# Patient Record
Sex: FEMALE | Race: WHITE | HISPANIC OR LATINO
[De-identification: names, ages, dates, MRNs, and addresses within clinical notes are randomized per-mention and may not be internally consistent; named-entity substitution may affect disease eponyms.]

---

## 2017-11-03 ENCOUNTER — RESULT REVIEW (OUTPATIENT)
Age: 42
End: 2017-11-03

## 2022-07-16 ENCOUNTER — EMERGENCY (EMERGENCY)
Facility: HOSPITAL | Age: 47
LOS: 0 days | Discharge: HOME | End: 2022-07-16
Attending: EMERGENCY MEDICINE
Payer: MEDICAID

## 2022-07-16 VITALS
TEMPERATURE: 98 F | HEART RATE: 65 BPM | SYSTOLIC BLOOD PRESSURE: 115 MMHG | DIASTOLIC BLOOD PRESSURE: 66 MMHG | RESPIRATION RATE: 16 BRPM | OXYGEN SATURATION: 100 %

## 2022-07-16 VITALS
RESPIRATION RATE: 18 BRPM | WEIGHT: 154.98 LBS | OXYGEN SATURATION: 98 % | DIASTOLIC BLOOD PRESSURE: 52 MMHG | HEART RATE: 67 BPM | TEMPERATURE: 98 F | SYSTOLIC BLOOD PRESSURE: 103 MMHG
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
## 2022-07-16 DIAGNOSIS — M25.572 PAIN IN LEFT ANKLE AND JOINTS OF LEFT FOOT: ICD-10-CM

## 2022-07-16 DIAGNOSIS — S82.892A OTHER FRACTURE OF LEFT LOWER LEG, INITIAL ENCOUNTER FOR CLOSED FRACTURE: ICD-10-CM
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
## 2022-07-16 PROCEDURE — 99284 EMERGENCY DEPT VISIT MOD MDM: CPT

## 2022-07-16 PROCEDURE — 73610 X-RAY EXAM OF ANKLE: CPT | Mod: 26,LT

## 2022-07-16 PROCEDURE — 72170 X-RAY EXAM OF PELVIS: CPT | Mod: 26

## 2022-07-16 PROCEDURE — 73564 X-RAY EXAM KNEE 4 OR MORE: CPT | Mod: 26,LT

## 2022-07-16 PROCEDURE — 72170 X-RAY EXAM OF PELVIS: CPT

## 2022-07-16 PROCEDURE — 73610 X-RAY EXAM OF ANKLE: CPT | Mod: LT

## 2022-07-16 PROCEDURE — 73564 X-RAY EXAM KNEE 4 OR MORE: CPT | Mod: LT

## 2022-07-16 PROCEDURE — 99284 EMERGENCY DEPT VISIT MOD MDM: CPT | Mod: 25

## 2022-07-16 RX ORDER — IBUPROFEN 200 MG
1 TABLET ORAL
Qty: 28 | Refills: 0
Start: 2022-07-16 | End: 2022-07-22

## 2022-07-16 RX ORDER — IBUPROFEN 200 MG
600 TABLET ORAL ONCE
Refills: 0 | Status: COMPLETED | OUTPATIENT
Start: 2022-07-16 | End: 2022-07-16

## 2022-07-16 RX ORDER — ACETAMINOPHEN 500 MG
1000 TABLET ORAL ONCE
Refills: 0 | Status: COMPLETED | OUTPATIENT
Start: 2022-07-16 | End: 2022-07-16

## 2022-07-16 RX ADMIN — Medication 600 MILLIGRAM(S): at 13:50

## 2022-07-16 RX ADMIN — Medication 1000 MILLIGRAM(S): at 13:48

## 2022-07-16 NOTE — ED ADULT NURSE NOTE - NS ED NURSE DISCH DISPOSITION
verbal instruction/skill demonstration/computer/internet/audio/written material/video/pictorial Discharged

## 2022-07-16 NOTE — ED STATDOCS - NSFOLLOWUPCLINICS_GEN_ALL_ED_FT
[facility]  Family Medicine  [address] [place] [address]  Phone: [phone]  Fax:

## 2022-07-16 NOTE — ED STATDOCS - PHYSICAL EXAMINATION
Constitutional: NAD AAOx3  Eyes: PERRLA EOMI  Head: Normocephalic atraumatic  Mouth: MMM  Cardiac: regular rate   Resp: Lungs CTAB  GI: Abd s/nt/nd  Neuro: CN2-12 intact  Skin: No rashes   MSK: swelling and TTP to left lateral malleoli. no TTP to base of fifth or foot. no TTP to knee. mild lower back TTP but no midline spinal TTP.

## 2022-07-16 NOTE — ED STATDOCS - ATTENDING APP SHARED VISIT CONTRIBUTION OF CARE
I, Roderick Gruber MD, personally saw the patient with ACP.  I have personally performed a face to face diagnostic evaluation on this patient.  I have reviewed the ACP note and agree with the history, exam, and plan of care, except as noted.   The initial assessment was performed by myself and then the patient was handed off to the ACP. The patient was followed and re-evaluated by the ACP. All labs, imaging and procedures were evaluated and performed by the ACP and I was available for consultation if any questions in the patients care came up.

## 2022-07-16 NOTE — ED STATDOCS - OBJECTIVE STATEMENT
47 y/o female presents to the ED c/o left ankle pain. Pt was getting out of car and lost balance, twisting her left ankle and landed on her back. Denies LOC. Now with moderate-severe pain to left ankle and unable to ambulate due to pain. No other injuries. No other injuries or complaints.

## 2022-07-16 NOTE — ED STATDOCS - NSFOLLOWUPINSTRUCTIONS_ED_ALL_ED_FT
Fractura del maléolo del peroné sin desplazamiento tratada con inmovilización    Nondisplaced Fibular Ankle Fracture Treated With Immobilization       Desire fractura del maléolo del peroné sin desplazamiento es desire quebradura simple de la parte inferior del peroné. El peroné es el más pequeño de los dos huesos de la parte inferior de la pierna y se ubica en el lado externo de la pierna. En el sami de desire fractura sin desplazamiento, las partes del hueso roto se alinean entre sí sin salirse de lugar. Esta afección no suele requerir cirugía y puede tratarse con desire férula, un yeso o desire bota ortopédica.      ¿Cuáles son las causas?    Esta afección puede ser causada por lo siguiente:  •Un golpe josefina y directo o desire lesión en el costado de la pierna.      •Desire torcedura o un movimiento de rotación josefina.      •Doblarse el tobillo.      •Tropezarse o caerse.        ¿Qué incrementa el riesgo?    Los siguientes factores pueden hacer que sea más propenso a desarrollar esta afección:  •Practicar deportes que implican correr y girar repetidamente, goran el baloncesto.      •Practicar deportes de contacto, goran fútbol americano o fútbol.      •Tener diabetes.      •Tener antecedentes de fracturas de tobillo.      •Obesidad.        ¿Cuáles son los signos o síntomas?    Los síntomas de esta afección incluyen:  •Dolor intenso que comienza inmediatamente después de la lesión.      •Moretones.      •Hinchazón.      •Ser incapaz de soportar tucker peso corporal (incapaz de poner peso) con el tobillo lesionado.      •El tobillo se vuelve sensible al tacto.        ¿Cómo se diagnostica?    Esta afección se diagnostica en función de lo siguiente:  •Echo antecedentes médicos.      •Un examen físico.    •Estudios de diagnóstico por imágenes para confirmar la fractura y verificar el alcance de la lesión. Estas pruebas pueden incluir lo siguiente:  •Radiografías.      •Radiografía de estrés. Xavier esta prueba, el médico le presionará el tobillo mientras kassandra la radiografía. [place] ayudará a determinar si el tobillo está estabilizado.      •Exploración por tomografía computarizada (TC).      •Resonancia magnética (RM).          ¿Cómo se trata?    El tratamiento de esta afección puede incluir:  •Desire férula.      •Colocación de hielo y elevación del tobillo.      •Un yeso.      •Un yeso extraíble o desire bota ortopédica.      •Usar muletas. Es posible que las necesite para poder caminar.      •Aleksandr medicamentos para aliviar el dolor.        Siga estas instrucciones en tucker casa:    Si tiene desire férula, un yeso extraíble o desire bota:     •Use la férula, el yeso o la bota goran se lo haya indicado el médico. Quíteselos solamente goran se lo haya indicado el médico.       •Aflójelos si siente hormigueo en los dedos del pie, se le adormecen o se le enfrían y se tornan azulados.      •Manténgalos limpios y secos.      Si tiene un yeso no extraíble:     • No ejerza presión en ninguna parte del yeso hasta que se haya endurecido por completo. [place] puede tardar varias horas.      • No introduzca nada dentro del yeso para rascarse la piel. [place] puede aumentar el riesgo de contraer desire infección.      •Controle la piel alrededor del yeso todos los días. Informe al médico acerca de cualquier inquietud.      •Puede aplicar desire loción en la piel seca alrededor de los bordes del yeso. No aplique loción en la piel por debajo del yeso.       •Mantenga el yeso seco y limpio.      Bañarse     • No tome garland de inmersión, no nade ni use el jacuzzi hasta que el médico lo autorice. Pregúntele al médico si puede ducharse. Anup vez solo le permitan darse garland de esponja.    •Si la férula, el yeso o la bota para caminar no son impermeables:  •No deje que se mojen.      •Cúbralos con un envoltorio hermético cuando tome un baño de inmersión o desire ducha.          Control del dolor, la rigidez y la hinchazón    •Si se lo indican, aplique hielo sobre la lorrie de la lesión. Para hacer esto:  •Si tiene desire férula, un yeso o desire bota extraíble, quíteselo goran se lo haya indicado el médico.      •Ponga el hielo en desire bolsa plástica.      •Coloque desire toalla entre la piel y la bolsa o entre el yeso y la bolsa.      •Aplique el hielo xavier 20 minutos, 2 o 3 veces por día.      •Retire el hielo si la piel se pone de color mckinley brillante. [place] es muy importante. Si no puede sentir dolor, calor o frío, tiene un mayor riesgo de que se dañe la lorrie.        •Mueva los dedos del pie con frecuencia para reducir la rigidez y la hinchazón.      •Cuando esté sentado o acostado, eleve la lorrie de la lesión por encima del nivel del corazón.      Actividad     • No use la pierna lesionada para sostener el peso del cuerpo hasta que el médico lo autorice. Use las muletas goran se lo haya indicado el médico.      •Vuelva a caminar sin muletas goran se lo hayan indicado.      •Cayla ejercicios y estiramientos goran se lo haya indicado el médico.        Instrucciones generales      •Use los medicamentos de venta eduard y los recetados solamente goran se lo haya indicado el médico.    •Pregúntele al médico si el medicamento recetado:  •Hace necesario que evite conducir o usar maquinaria.    •Puede causarle estreñimiento. Es posible que tenga que aleksandr estas medidas para prevenir o tratar el estreñimiento:  •Usar medicamentos recetados o de venta eduard.      •Consumir alimentos ricos en fibra, goran frijoles, cereales integrales, y frutas y verduras frescas.      •Limitar el consumo de alimentos ricos en grasa y azúcares procesados, goran los alimentos fritos o dulces.          •Pregúntele al médico cuándo puede volver a conducir, si tiene desire férula, un yeso o desire bota en el tobillo.      • No consuma ningún producto que contenga nicotina o tabaco, goran cigarrillos, cigarrillos electrónicos y tabaco de mascar. Estos pueden retrasar la consolidación del hueso. Si necesita ayuda para dejar de fumar, consulte al médico.      •Cumpla con todas las visitas de seguimiento. [place] es importante.        Comuníquese con un médico si:    •El yeso se daña o se rompe.      •El dolor no mejora con medicamentos.        Solicite ayuda de inmediato si:    •Tiene dolor intenso o presenta mayor hinchazón en el tobillo, la pierna o el pie que no logra controlar con medicamentos.      •La piel o las uñas debajo de la lesión se ponen azules o grises, o se sienten frías o entumecidas.      •Tiene escozor o ardor en la piel debajo del yeso.      •Siente un mal olor u observa pus que proviene del interior del yeso.      •No puede  los dedos del pie.        Resumen    •Desire fractura del maléolo del peroné sin desplazamiento es desire quebradura simple de un hueso de la parte inferior de la pierna (peroné).      •Esta afección puede tratarse con desire férula, hielo y elevación, un yeso o un yeso extraíble o desire bota para caminar. También puede necesitar muletas goran ayuda para caminar mientras el tobillo waqas.      •Para controlar el dolor, la rigidez y la hinchazón, colóquese hielo en la lorrie lesionada goran se lo haya indicado el médico.      •No debería usar la pierna lesionada para sostener el peso del cuerpo hasta que el médico lo autorice. Use las muletas goran se lo haya indicado el médico.      Esta información no tiene goran fin reemplazar el consejo del médico. Asegúrese de hacerle al médico cualquier pregunta que tenga.      Document Revised: 07/02/2021 Document Reviewed: 07/02/2021    Elsevier Patient Education © 2022 [employer]

## 2022-07-16 NOTE — ED STATDOCS - NS ED ATTENDING STATEMENT MOD
This was a shared visit with the ANAND. I reviewed and verified the documentation and independently performed the documented:

## 2022-07-16 NOTE — ED STATDOCS - PATIENT PORTAL LINK FT
You can access the FollowMyHealth Patient Portal offered by [facility] by registering at the following website: [email or web address]. By joining [facility]’s FollowMyHealth portal, you will also be able to view your health information using other applications (apps) compatible with our system.

## 2022-07-16 NOTE — ED STATDOCS - NS_ ATTENDINGSCRIBEDETAILS _ED_A_ED_FT
I, Roderick Gruber MD,  performed the initial face to face bedside interview with this patient regarding history of present illness, review of symptoms and relevant past medical, social and family history.  I completed an independent physical examination.  I was the initial provider who evaluated this patient.   I personally saw the patient and performed a substantive portion of the visit including all aspects of the medical decision making.  The history, relevant review of systems, past medical and surgical history, medical decision making, and physical examination was documented by the scribe in my presence and I attest to the accuracy of the documentation.

## 2022-07-16 NOTE — ED STATDOCS - NS ED ROS FT
Constitutional: No fever or chills  Eyes: No visual changes  HEENT: No throat pain  CV: No chest pain  Resp: No SOB no cough  GI: No abd pain, nausea or vomiting  : No dysuria  MSK: +ankle pain  Skin: No rash  Neuro: No headache

## 2022-07-18 PROBLEM — Z78.9 OTHER SPECIFIED HEALTH STATUS: Chronic | Status: ACTIVE | Noted: 2022-07-16

## 2022-07-20 ENCOUNTER — APPOINTMENT (OUTPATIENT)
Dept: ORTHOPEDIC SURGERY | Facility: HOSPITAL | Age: 47
End: 2022-07-20

## 2022-07-20 ENCOUNTER — OUTPATIENT (OUTPATIENT)
Dept: OUTPATIENT SERVICES | Facility: HOSPITAL | Age: 47
LOS: 1 days | End: 2022-07-20
Payer: SELF-PAY

## 2022-07-20 ENCOUNTER — RESULT REVIEW (OUTPATIENT)
Age: 47
End: 2022-07-20

## 2022-07-20 VITALS
WEIGHT: 170 LBS | SYSTOLIC BLOOD PRESSURE: 123 MMHG | BODY MASS INDEX: 33.38 KG/M2 | RESPIRATION RATE: 14 BRPM | HEART RATE: 71 BPM | TEMPERATURE: 97 F | HEIGHT: 60 IN | DIASTOLIC BLOOD PRESSURE: 73 MMHG

## 2022-07-20 DIAGNOSIS — S92.909A UNSPECIFIED FRACTURE OF UNSPECIFIED FOOT, INITIAL ENCOUNTER FOR CLOSED FRACTURE: ICD-10-CM

## 2022-07-20 DIAGNOSIS — S82.63XA DISPLACED FRACTURE OF LATERAL MALLEOLUS OF UNSPECIFIED FIBULA, INITIAL ENCOUNTER FOR CLOSED FRACTURE: ICD-10-CM

## 2022-07-20 DIAGNOSIS — S82.899A OTHER FRACTURE OF UNSPECIFIED LOWER LEG, INITIAL ENCOUNTER FOR CLOSED FRACTURE: ICD-10-CM

## 2022-07-20 PROCEDURE — G0463: CPT

## 2022-07-20 PROCEDURE — 73610 X-RAY EXAM OF ANKLE: CPT | Mod: 26,LT

## 2022-07-20 PROCEDURE — 73610 X-RAY EXAM OF ANKLE: CPT

## 2022-08-03 ENCOUNTER — RESULT REVIEW (OUTPATIENT)
Age: 47
End: 2022-08-03

## 2022-08-03 ENCOUNTER — APPOINTMENT (OUTPATIENT)
Dept: ORTHOPEDIC SURGERY | Facility: HOSPITAL | Age: 47
End: 2022-08-03

## 2022-08-03 ENCOUNTER — OUTPATIENT (OUTPATIENT)
Dept: OUTPATIENT SERVICES | Facility: HOSPITAL | Age: 47
LOS: 1 days | End: 2022-08-03
Payer: SELF-PAY

## 2022-08-03 VITALS
HEIGHT: 64 IN | DIASTOLIC BLOOD PRESSURE: 66 MMHG | HEART RATE: 66 BPM | TEMPERATURE: 97 F | RESPIRATION RATE: 16 BRPM | BODY MASS INDEX: 29.02 KG/M2 | WEIGHT: 170 LBS | SYSTOLIC BLOOD PRESSURE: 108 MMHG

## 2022-08-03 DIAGNOSIS — M79.609 PAIN IN UNSPECIFIED LIMB: ICD-10-CM

## 2022-08-03 PROCEDURE — G0463: CPT

## 2022-08-03 PROCEDURE — 73610 X-RAY EXAM OF ANKLE: CPT | Mod: 26,LT

## 2022-08-03 PROCEDURE — 73610 X-RAY EXAM OF ANKLE: CPT

## 2022-08-04 DIAGNOSIS — S82.63XA DISPLACED FRACTURE OF LATERAL MALLEOLUS OF UNSPECIFIED FIBULA, INITIAL ENCOUNTER FOR CLOSED FRACTURE: ICD-10-CM

## 2022-08-31 ENCOUNTER — RESULT REVIEW (OUTPATIENT)
Age: 47
End: 2022-08-31

## 2022-08-31 ENCOUNTER — OUTPATIENT (OUTPATIENT)
Dept: OUTPATIENT SERVICES | Facility: HOSPITAL | Age: 47
LOS: 1 days | End: 2022-08-31
Payer: SELF-PAY

## 2022-08-31 ENCOUNTER — APPOINTMENT (OUTPATIENT)
Dept: ORTHOPEDIC SURGERY | Facility: HOSPITAL | Age: 47
End: 2022-08-31

## 2022-08-31 VITALS
WEIGHT: 170 LBS | HEART RATE: 68 BPM | SYSTOLIC BLOOD PRESSURE: 108 MMHG | DIASTOLIC BLOOD PRESSURE: 73 MMHG | RESPIRATION RATE: 14 BRPM | BODY MASS INDEX: 29.02 KG/M2 | TEMPERATURE: 97 F | HEIGHT: 64 IN

## 2022-08-31 DIAGNOSIS — M25.50 PAIN IN UNSPECIFIED JOINT: ICD-10-CM

## 2022-08-31 PROCEDURE — 73610 X-RAY EXAM OF ANKLE: CPT | Mod: 26,LT

## 2022-08-31 PROCEDURE — 73610 X-RAY EXAM OF ANKLE: CPT

## 2022-08-31 PROCEDURE — G0463: CPT

## 2022-08-31 RX ORDER — CELECOXIB 200 MG/1
200 CAPSULE ORAL TWICE DAILY
Qty: 42 | Refills: 0 | Status: ACTIVE | COMMUNITY
Start: 2022-08-31 | End: 1900-01-01

## 2022-09-06 DIAGNOSIS — S82.63XA DISPLACED FRACTURE OF LATERAL MALLEOLUS OF UNSPECIFIED FIBULA, INITIAL ENCOUNTER FOR CLOSED FRACTURE: ICD-10-CM

## 2022-09-28 ENCOUNTER — APPOINTMENT (OUTPATIENT)
Dept: ORTHOPEDIC SURGERY | Facility: HOSPITAL | Age: 47
End: 2022-09-28

## 2022-09-28 ENCOUNTER — RESULT REVIEW (OUTPATIENT)
Age: 47
End: 2022-09-28

## 2022-09-28 ENCOUNTER — OUTPATIENT (OUTPATIENT)
Dept: OUTPATIENT SERVICES | Facility: HOSPITAL | Age: 47
LOS: 1 days | End: 2022-09-28
Payer: SELF-PAY

## 2022-09-28 VITALS
HEIGHT: 64 IN | BODY MASS INDEX: 29.02 KG/M2 | RESPIRATION RATE: 14 BRPM | DIASTOLIC BLOOD PRESSURE: 83 MMHG | HEART RATE: 71 BPM | SYSTOLIC BLOOD PRESSURE: 123 MMHG | WEIGHT: 170 LBS

## 2022-09-28 DIAGNOSIS — M79.643 PAIN IN UNSPECIFIED HAND: ICD-10-CM

## 2022-09-28 DIAGNOSIS — S82.63XA DISPLACED FRACTURE OF LATERAL MALLEOLUS OF UNSPECIFIED FIBULA, INITIAL ENCOUNTER FOR CLOSED FRACTURE: ICD-10-CM

## 2022-09-28 PROCEDURE — G0463: CPT

## 2022-09-28 PROCEDURE — 73610 X-RAY EXAM OF ANKLE: CPT | Mod: 26,LT

## 2022-09-28 PROCEDURE — 73610 X-RAY EXAM OF ANKLE: CPT

## 2022-10-26 ENCOUNTER — APPOINTMENT (OUTPATIENT)
Dept: ORTHOPEDIC SURGERY | Facility: HOSPITAL | Age: 47
End: 2022-10-26

## 2022-10-26 ENCOUNTER — OUTPATIENT (OUTPATIENT)
Dept: OUTPATIENT SERVICES | Facility: HOSPITAL | Age: 47
LOS: 1 days | End: 2022-10-26
Payer: SELF-PAY

## 2022-10-26 VITALS
BODY MASS INDEX: 30.39 KG/M2 | HEIGHT: 64 IN | RESPIRATION RATE: 14 BRPM | DIASTOLIC BLOOD PRESSURE: 77 MMHG | HEART RATE: 66 BPM | WEIGHT: 178 LBS | TEMPERATURE: 96.9 F | SYSTOLIC BLOOD PRESSURE: 111 MMHG

## 2022-10-26 DIAGNOSIS — M25.50 PAIN IN UNSPECIFIED JOINT: ICD-10-CM

## 2022-10-26 DIAGNOSIS — S82.63XA DISPLACED FRACTURE OF LATERAL MALLEOLUS OF UNSPECIFIED FIBULA, INITIAL ENCOUNTER FOR CLOSED FRACTURE: ICD-10-CM

## 2022-10-26 PROCEDURE — G0463: CPT

## 2022-10-31 DIAGNOSIS — S82.63XA DISPLACED FRACTURE OF LATERAL MALLEOLUS OF UNSPECIFIED FIBULA, INITIAL ENCOUNTER FOR CLOSED FRACTURE: ICD-10-CM

## 2022-12-14 ENCOUNTER — APPOINTMENT (OUTPATIENT)
Dept: ORTHOPEDIC SURGERY | Facility: HOSPITAL | Age: 47
End: 2022-12-14